# Patient Record
Sex: MALE | Race: WHITE | NOT HISPANIC OR LATINO | URBAN - METROPOLITAN AREA
[De-identification: names, ages, dates, MRNs, and addresses within clinical notes are randomized per-mention and may not be internally consistent; named-entity substitution may affect disease eponyms.]

---

## 2022-02-28 ENCOUNTER — OFFICE VISIT (OUTPATIENT)
Dept: URBAN - METROPOLITAN AREA CLINIC 17 | Facility: CLINIC | Age: 77
End: 2022-02-28
Payer: MEDICARE

## 2022-02-28 DIAGNOSIS — H18.832 RECURRENT EROSION OF CORNEA, LEFT EYE: Primary | ICD-10-CM

## 2022-02-28 PROCEDURE — 99203 OFFICE O/P NEW LOW 30 MIN: CPT | Performed by: OPTOMETRIST

## 2022-02-28 RX ORDER — OFLOXACIN 3 MG/ML
0.3 % SOLUTION/ DROPS OPHTHALMIC
Qty: 5 | Refills: 0 | Status: ACTIVE
Start: 2022-02-28

## 2022-02-28 ASSESSMENT — INTRAOCULAR PRESSURE
OS: 20
OD: 19

## 2022-02-28 NOTE — IMPRESSION/PLAN
Impression: Recurrent erosion of cornea, left eye: H18.832. Plan: Discussed diagnosis in detail with patient. Discussed treatment options with patient. After instillation of proparacaine, removed cornea nodule at slit lamp with cotton swab and spud. Pt tolerated procedure well. Recommend pt begin ofloxacin QID OS x1 week then d/c (erx'd). Recommend Refresh PM QHS x6 weeks, regular Refresh AT's throughout the day, at least 4-6x daily. Condition will likely heal within the next 24 hrs. Pt understands eye will likely feel uncomfortable for the rest of the day. Pt to contact office in 4-6 weeks if no improvement.